# Patient Record
(demographics unavailable — no encounter records)

---

## 2025-01-14 NOTE — HISTORY OF PRESENT ILLNESS
[FreeTextEntry1] : Seen initially with  acute onset gross hematuria with clots 9/19. . He had no flank or abdominal pain but did note hesitancy, decreased FOS and some straining to void.  He had USG which noted left hydronephrosis and a 5cm mass in the bladder though unclear if this clot or tissue as no color doppler on the films reviewed. S/p TURBt of large tumor in the trigone around the later wall and BN to 12 o'clock. 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 CT PET scan negative for metastasis but has high grade obstruction left kidney. Cr 1.3 pre-TURBT S/p just completed chemo-radiation; Having frequency every 1.5 hours, nocturia 5 times  with some burning at the end of voiding. No hematuria. He is on tamsulosin.  CT scan noted mass enlarging but now S/P stent exchange and TURBT: path NO cancer. ended up admitted after claire out with febrile UTI.  Seen last month  with several days right sided abdominal pain - back to front. harder to move around. His frequency better last few day. Wife reports appetite down. CT scan was Ok though had gallstones. saw MV 2 weeks ago with UTI: pseudomonas again. Finished antibiotic last week. Voiding OK with no sign of UTI.  seen earlier this month with >12 hours of dysuria and increased frequency; no hematuria or fevers chills but he;s concerned about UTI. Culture last week negative (that was a routine one). He has had 2 Pseudomonas UTIs before. Just started suppression.  called on Thursday with dysuria and blader pain with increased frequency; no fevers, chills or hematuria. Started Ciprofloxacin Saturday  - symptoms better. Culture as before: Pseudomonas.   changed stent in July: still has necrotic tissues in area - biopsy proven. still on low dose Cipro - no UTI symptoms. urine clear and no hematuria.  Now on Methenamine - urine clear with no cloudiness, hematuria or dysuria. C/o on neck pain when turns head etc. Weight good.   12/21 - CT scans negative e June; changed stent in October - still narrowing   2/22 - changed stent in January - stills has narrow distal ureter with dilated ureter above so replaced JJ doing well with no UTI symptoms or hematuria.  9/22 - stent changed last visit - necrosis less @ the UO but still with distal narrowing no UTIs on methenamine   12/22 here after one episode gross hematuria 2 days ago. No clots dysuria or pain or change in voiding. no fevers or chllls though felt cold thus am . No flank pain   2/23 last stent exchange did ureteroscopy - very tight stricture: Biopsy negative cancer  CT scan this week DOMINIK    5/23 still dealing with glaucoma - one had procedure second pending. had discussed ;last visit doing reimplant  no bleeding - occasional dysuria   2/6/24 - F/u visit s/p change of renal stent in November.  Since then abx given x1 for c/o burning urination. Currently denies hematuria, dysuria.  CT urogram WAW done on Jan 11 - here for clarification of results  reviewed CT scan and others going back for 2 years: all stable   7/2/24 - Presents today for POA s/p cystoscopy, exchange of left double-J stent on 5/20/24 - done every 6 months.  CT urogram and chest CT s/p procedure stable, no acute changes noted.  Denies flank pain. dysuria or hematuria. Last UTI in May before procedure.  Maintained on methenamine x 2 years for h/i recurrent UTIS   1/25 - stent exchange 11/24 had heart sten tplaced 12/24  no issues - occasional; hematuria - small and or dysuria  negative imagine 12/24

## 2025-01-15 NOTE — REVIEW OF SYSTEMS
[Fatigue] : fatigue [Diarrhea: Grade 0] : Diarrhea: Grade 0 [Dysuria] : dysuria [Joint Pain] : joint pain [Fever] : no fever [Chills] : no chills [Chest Pain] : no chest pain [Palpitations] : no palpitations [Lower Ext Edema] : no lower extremity edema [Shortness Of Breath] : no shortness of breath [Cough] : no cough [Abdominal Pain] : no abdominal pain [Vomiting] : no vomiting [Constipation] : no constipation [Joint Stiffness] : no joint stiffness [Muscle Pain] : no muscle pain [Muscle Weakness] : no muscle weakness [Skin Rash] : no skin rash [Dizziness] : no dizziness [Difficulty Walking] : no difficulty walking [Easy Bleeding] : no tendency for easy bleeding [FreeTextEntry8] : as above

## 2025-01-15 NOTE — HISTORY OF PRESENT ILLNESS
[T: ___] : T[unfilled] [N: ___] : N[unfilled] [M: ___] : M[unfilled] [AJCC Stage: ____] : AJCC Stage: [unfilled] [de-identified] : Vance Balderas is a 71-year-old male with bladder cancer who was found to have microscopic hematuria in the beginning of this year and diagnosed with UTI, treated with antibiotics. He presented with acute onset gross hematuria with clots on August 29, associated with lower abdominal pressure pain with difficulty urination, after blood clots coming out from his urine he felt easier to pass the urine.  He came to [facility] ED, had USG which noted left hydronephrosis and a 5cm mass in the bladder though unclear if this clot or tissue as no color doppler on the films reviewed. He was d/c'd and followed up with Dr. Feldman. Again he presented above mentioned symptoms, visited [facility] ED x2 and was placed Claire catheter. He underwent cystoscopy and was found to have a 5cm bladder tumor left trigone, had TURBT on 9/9/2019. 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 His claire catheter was removed on September 11 at Dr. Feldman's clinic.  PET CT 9/26/2019 revealed hypermetabolic urinary bladder wall thickening extending into the left UPJ and left distal ureter, left ureteral obstruction, no evidence of metastasis. Noncon CT brain did not show brain metastasis.   Urine flow is normal, no gross hematuria and blood clots. He does not have hesitancy, straining for urination, burning and dysuria and incontinence.  Nocturia 2 to 3.    11/13/19 : MRI abdomen and pelvis on Nov 13 showed interval decrease from 3.8x2.5cm in September to recent 3.5x1.1cmn the bladder trigone area  11/18/19 : S/P C2 carboplatin and gemcitabine, he felt nauseous one day, has weakness, fatigue and dizziness, no diarrhea. His appetite was reduced. Now he feels much better for his appetite, no nausea/vomiting/constipation. He had non-pruritic skin rashes on both arms, both anterior thigh and right flank area on Nov 11 and 12, disappeared after taking benadryl 50mg daily x2  . 12/9/19 : s/p Cycle 3 Carboplatin + Etoposide 12/23/19 start Cycle 4, Rt to start tomorrow. Fatigue and mild nausea bothers him and has poor oral intake 3-4 days after chemotherapy. Taste buds with metal taste and thus difficult to eat however keeping his weight stable. No ha, chest pain, shortness of breath, NVD aside from 3-4 days after rx and decreased appetite.   1/2/20 : Cycle 4 Day 11 of Carboplatin + Etoposide, continues on RT until 2/2/2020. Feels fatigued overall but this is day 11 and should be on the mend. Has lingering metallic taste in the mouth. No fevers, chills, SOB, chest pain. cough, dysuria, or hematuria.    2/3/2020: Completed cycle 4 chemo on 1/2/2020, RT on 2/3/2020 with total 6 weeks of course. Has mild fatigue, burning sensation only in the beginning of urination, denies gross hematuria, dysuria.   Nocturia 5.  2/14/2020: CT abdomen and pelvis showed slightly increased size left posterolateral bladder wall mass since 11/13/2019. Persistent thickening distal left ureter right, new right lateral bladder wall thickening.  10/16/2020 he started neck pain while moving around his neck, does not have headache, blurry vision and unsteady gait. He denies gross hematuria and abdominal pain.   2/24/2020 he underwent cystoscopy TURBT, pathology showed no cancer.    3/23/2020  Stable pulmonary nodules.  No upper tract lesion identified. Thickening along the left posterior lateral bladder and associated outpouching at the UPJ is essentially unchanged in appearance.   5/4/2020: He has burning urination, frequency, fever, 102. UA showed UTI. He completed levofloxacin 750mg daily for 7 days, Methenamine for 3 months. Post levofloxacin, urine culture is negative. He states below knee pain Left vs right. He needs ureteral stent exchange due to virus pandemic which is delayed.   5/26/2020 CT c/a/p showed stable exam, no change in the thickening of the left posterior bladder wall.   6/15/2020 he has been on cipro 250mg daily for 20 days for prophylaxis given frequent UTI history. He denies fever, burning urination, frequency. He is waiting for ureteral stent removed vs exchanged.   7/2/2020 TURBT showed benign tissue in the bladder.   8/17/2020 CT c/a/p revealed mild bladder wall thickening, no disease in the upper tract.   9/16/2020 he developed a couple of pseudomonas UTIs in July and early August. He reports left leg muscle pain more than 5 months,  intermittent, sometimes mild, other time severe, not related to walking and resting. Both cheek skins have itchiness. Urination is normal, he denies burning, frequency, gross hematuria. He takes methenamine for UTI prophylaxis.   10/16/2020 : Mr. PONCE is here for a follow up. He as recently seen in [facility]........ should be seen in December unless there is an issue.   11/11/2020: CT torso : No edvidence of disease, RML 5 mm lung nodule without change.   11/16/2020: Mr. PONCE is here for a follow up post surveillance CT showing DOMINIK. He complains of headache and neck pain around two months, denies unsteady gait, nausea, vomiting, blurry vision, and dizziness.   12/6/2020 MRI head and cervical spine: Possible endplate erosions or Schmorl's nodes involving the endplates adjoining the C6-C7 disc space. Apparent edema involving the C6-C7 intervertebral disc and adjoining endplates. Evaluation for abnormal enhancement is limited due to motion on the sagittal T1 fat-saturated postcontrast images. Findings likely represent the presence of degenerative changes. The presence of discitis/osteomyelitis is not excluded.  12/7/2020 he states his headache was resolved, neck pain is improved, otherwise denies fever, cough and chest pain.    1/13/2021 His neck pain is sometimes more, 5-6/10, sometimes less 3-4/10. Cystoscopy on 1/11 with Dr. Feldman showed necrotic area around ureteral orifice, decreased in size from prior. He denies gross hematuria.   2/14/21 MRI cervical spine showed edema at C3 and C4 likely related to stress reaction. The degree of edema and enhancement at this level is decreased compared to the prior examination.  2/26 He still complains of b/l neck pain sometimes 6-9/10 intensity, relieved by ibuprofen and tylenol. He eats well, denies gross hematuria and abdominal pain.    3/14/2021 ; CTU + abd and chest IMPRESSION: RML lung nodule stable at 5 mm. Mild irregular bladder wall thickening along the left trigone most likely representing the patient's known bladder tumor. Mild soft tissue thickening along the proximal left ureter; uncertain if this is due to inflammation arising from the presence of the stent versus ureteral lesion. No evidence of metastatic disease.  3/24/2021 : Mr. Ponce joined me via telehealth. His restaging scan son 3/14/2021 showed no clearcut recurrence however some thickening was noted along the proximal ureter. He still has neck pain 8/10, will be seen by Dr. Lozada and Dr. Maldonado, Orthopedics for topical injection. He continues to be on methenamine for UTI prophylaxis. He denies gross hematuria, abdominal pain and weight loss.  6/22/2021  Stable mild thickening of the left posterior bladder wall involving the left ureterovesical junction. Unchanged thickening of the distal left ureteral wall. Improved thickening of the left mid ureteral wall. Stable lobulation in the contour of the anterior left prostate gland as noted above. The left seminal vesicle is also anteriorly deviated. See above. No developing metastatic disease nor adenopathy.   7/23/21 : Here for a follow up after scans. Some fatigue in the evening, but able to take of himself and perform ADLs without difficulty. Mr. PONCE denies fevers, chills, headaches, cough, SOB, chest pain, any swelling, nausea, vomiting, diarrhea, rash, or malaise. Good PO and weight is good.   1/9/22 CT c/a/p showed At the distal ureter there is thickening of the ureter and the urinary bladder wall at the ureteral insertion. The adjacent left seminal vesicle is adherent to the ureter and the prostate is slightly shifted to the left pelvic sidewall.  1/19/2022: here for follow up. He had urinary stent exchange done 1/10 by Dr. Feldman. He has been doing well.  7/20/22 left ureteral stent was changed recently. He reports feeling weak and cold in AM, denies gross hematuria.   1/18/23 left ureteral stent was changed on Jan 9. He feels cold and weak, denies gross hematuria.   7/19/23 reports dizziness, denies blurry vision and headache.  [de-identified] : small cell bladder cancer  [FreeTextEntry1] : Carbo + Etoposide + RT (2/2020) [de-identified] : 1/8/24: Patient reports that for the last two days when he urinates he feels burning and has white discharge for two days. He denies fever and back pain. Denies frequency, urgency and hematuria. Patient also reports this sensation mid last month, called Urology and was prescribed keflex. He was also prescribed abx for UTI on several other occasions this year. Says symptoms resolved with abx, but have recurred. His ureteral stent was last exchanged on 11/27/23. He has an appt wiht Urology on 2/6.   Reports waxing and waning anergy. Appetite is okay - for lunch he has a good portion, dinner is smaller. He sometimes takes omeprazole for acid reflux. Denies diarrhea, sometimes harder stool, however has BM 1-2x per day. He denies chest pain, palpitations, shortness of breath and coughing. He has chronic intermittent joint pain on his left side, takes tylenol. Denies headache, visual changes and neuropathy.    Urology:               2/6/24:                         - s/p change of renal stent in November.                         - s/p abx  x1 for c/o burning on urination. Currently denies hematuria, dysuria. f/b CT urogram WAW.                          7/2/24:                         - seen for POA s/p cystoscopy, exchange of left double-J stent on 5/20/24 - done every 6 months.                         - CT urogram and chest CT s/p procedure stable, no acute changes noted. Denies flank pain. dysuria or hematuria. Last UTI in May before procedure.                         - Maintained on methenamine Hippurate x 2 years for h/o recurrent UTIs  Medical Oncology 7/10/24 - pt is doing well, NAD, recently noted to have recurrent UTI that is been treated with cephalexin 500mg  T.i.D by Dr. Feldman urologist since 7/6/24 [will complete on 7/13/24]. Will resume prophylactic Methenamine Hippurate post completion of cephalexin. Urine flow is normal, no hesitancy, or pain but with leakage.     CT CAP on June 26, 2024:  - revealed Mild wall thickening of the left posterolateral bladder wall and left aspect of the trigone, without   significant change. - Left double pigtail ureteral stent. Mild circumferential wall thickening of the proximal to distal left ureter, without significant change. This may be related to inflammation. No filling    defects within bilateral pelvicalyceal systems or ureters to indicate urothelial lesion.  - No evidence of hydronephrosis intrarenal calculus ureteral calculus bilaterally.  - No evidence of bladder calculus. No change in right pulmonary nodules. These are unchanged from 6/22/2021, given the lack of interval change in 3 years likely benign in    nature.  1/15/25: Patient presents for follow up with wife. Has some hematuria. CT c/a/p (12/18/24): DOMNIIK. Stable lung nodules.  s/p heart stent in December 2, 2024. He is now on prasugrel 10 mg daily now for one year, and aspirin 81 mg daily, and atorvastatin. He denies increased urinary frequency. At night, gets up 2-3x to urinate.

## 2025-01-15 NOTE — HISTORY OF PRESENT ILLNESS
[T: ___] : T[unfilled] [N: ___] : N[unfilled] [M: ___] : M[unfilled] [AJCC Stage: ____] : AJCC Stage: [unfilled] [de-identified] : Vance Balderas is a 71-year-old male with bladder cancer who was found to have microscopic hematuria in the beginning of this year and diagnosed with UTI, treated with antibiotics. He presented with acute onset gross hematuria with clots on August 29, associated with lower abdominal pressure pain with difficulty urination, after blood clots coming out from his urine he felt easier to pass the urine.  He came to [facility] ED, had USG which noted left hydronephrosis and a 5cm mass in the bladder though unclear if this clot or tissue as no color doppler on the films reviewed. He was d/c'd and followed up with Dr. Feldman. Again he presented above mentioned symptoms, visited [facility] ED x2 and was placed Claire catheter. He underwent cystoscopy and was found to have a 5cm bladder tumor left trigone, had TURBT on 9/9/2019. 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 His claire catheter was removed on September 11 at Dr. Feldman's clinic.  PET CT 9/26/2019 revealed hypermetabolic urinary bladder wall thickening extending into the left UPJ and left distal ureter, left ureteral obstruction, no evidence of metastasis. Noncon CT brain did not show brain metastasis.   Urine flow is normal, no gross hematuria and blood clots. He does not have hesitancy, straining for urination, burning and dysuria and incontinence.  Nocturia 2 to 3.    11/13/19 : MRI abdomen and pelvis on Nov 13 showed interval decrease from 3.8x2.5cm in September to recent 3.5x1.1cmn the bladder trigone area  11/18/19 : S/P C2 carboplatin and gemcitabine, he felt nauseous one day, has weakness, fatigue and dizziness, no diarrhea. His appetite was reduced. Now he feels much better for his appetite, no nausea/vomiting/constipation. He had non-pruritic skin rashes on both arms, both anterior thigh and right flank area on Nov 11 and 12, disappeared after taking benadryl 50mg daily x2  . 12/9/19 : s/p Cycle 3 Carboplatin + Etoposide 12/23/19 start Cycle 4, Rt to start tomorrow. Fatigue and mild nausea bothers him and has poor oral intake 3-4 days after chemotherapy. Taste buds with metal taste and thus difficult to eat however keeping his weight stable. No ha, chest pain, shortness of breath, NVD aside from 3-4 days after rx and decreased appetite.   1/2/20 : Cycle 4 Day 11 of Carboplatin + Etoposide, continues on RT until 2/2/2020. Feels fatigued overall but this is day 11 and should be on the mend. Has lingering metallic taste in the mouth. No fevers, chills, SOB, chest pain. cough, dysuria, or hematuria.    2/3/2020: Completed cycle 4 chemo on 1/2/2020, RT on 2/3/2020 with total 6 weeks of course. Has mild fatigue, burning sensation only in the beginning of urination, denies gross hematuria, dysuria.   Nocturia 5.  2/14/2020: CT abdomen and pelvis showed slightly increased size left posterolateral bladder wall mass since 11/13/2019. Persistent thickening distal left ureter right, new right lateral bladder wall thickening.  10/16/2020 he started neck pain while moving around his neck, does not have headache, blurry vision and unsteady gait. He denies gross hematuria and abdominal pain.   2/24/2020 he underwent cystoscopy TURBT, pathology showed no cancer.    3/23/2020  Stable pulmonary nodules.  No upper tract lesion identified. Thickening along the left posterior lateral bladder and associated outpouching at the UPJ is essentially unchanged in appearance.   5/4/2020: He has burning urination, frequency, fever, 102. UA showed UTI. He completed levofloxacin 750mg daily for 7 days, Methenamine for 3 months. Post levofloxacin, urine culture is negative. He states below knee pain Left vs right. He needs ureteral stent exchange due to virus pandemic which is delayed.   5/26/2020 CT c/a/p showed stable exam, no change in the thickening of the left posterior bladder wall.   6/15/2020 he has been on cipro 250mg daily for 20 days for prophylaxis given frequent UTI history. He denies fever, burning urination, frequency. He is waiting for ureteral stent removed vs exchanged.   7/2/2020 TURBT showed benign tissue in the bladder.   8/17/2020 CT c/a/p revealed mild bladder wall thickening, no disease in the upper tract.   9/16/2020 he developed a couple of pseudomonas UTIs in July and early August. He reports left leg muscle pain more than 5 months,  intermittent, sometimes mild, other time severe, not related to walking and resting. Both cheek skins have itchiness. Urination is normal, he denies burning, frequency, gross hematuria. He takes methenamine for UTI prophylaxis.   10/16/2020 : Mr. PONCE is here for a follow up. He as recently seen in [facility]........ should be seen in December unless there is an issue.   11/11/2020: CT torso : No edvidence of disease, RML 5 mm lung nodule without change.   11/16/2020: Mr. PONCE is here for a follow up post surveillance CT showing DOMINIK. He complains of headache and neck pain around two months, denies unsteady gait, nausea, vomiting, blurry vision, and dizziness.   12/6/2020 MRI head and cervical spine: Possible endplate erosions or Schmorl's nodes involving the endplates adjoining the C6-C7 disc space. Apparent edema involving the C6-C7 intervertebral disc and adjoining endplates. Evaluation for abnormal enhancement is limited due to motion on the sagittal T1 fat-saturated postcontrast images. Findings likely represent the presence of degenerative changes. The presence of discitis/osteomyelitis is not excluded.  12/7/2020 he states his headache was resolved, neck pain is improved, otherwise denies fever, cough and chest pain.    1/13/2021 His neck pain is sometimes more, 5-6/10, sometimes less 3-4/10. Cystoscopy on 1/11 with Dr. Feldman showed necrotic area around ureteral orifice, decreased in size from prior. He denies gross hematuria.   2/14/21 MRI cervical spine showed edema at C3 and C4 likely related to stress reaction. The degree of edema and enhancement at this level is decreased compared to the prior examination.  2/26 He still complains of b/l neck pain sometimes 6-9/10 intensity, relieved by ibuprofen and tylenol. He eats well, denies gross hematuria and abdominal pain.    3/14/2021 ; CTU + abd and chest IMPRESSION: RML lung nodule stable at 5 mm. Mild irregular bladder wall thickening along the left trigone most likely representing the patient's known bladder tumor. Mild soft tissue thickening along the proximal left ureter; uncertain if this is due to inflammation arising from the presence of the stent versus ureteral lesion. No evidence of metastatic disease.  3/24/2021 : Mr. Ponce joined me via telehealth. His restaging scan son 3/14/2021 showed no clearcut recurrence however some thickening was noted along the proximal ureter. He still has neck pain 8/10, will be seen by Dr. Lozada and Dr. Maldonado, Orthopedics for topical injection. He continues to be on methenamine for UTI prophylaxis. He denies gross hematuria, abdominal pain and weight loss.  6/22/2021  Stable mild thickening of the left posterior bladder wall involving the left ureterovesical junction. Unchanged thickening of the distal left ureteral wall. Improved thickening of the left mid ureteral wall. Stable lobulation in the contour of the anterior left prostate gland as noted above. The left seminal vesicle is also anteriorly deviated. See above. No developing metastatic disease nor adenopathy.   7/23/21 : Here for a follow up after scans. Some fatigue in the evening, but able to take of himself and perform ADLs without difficulty. Mr. PONCE denies fevers, chills, headaches, cough, SOB, chest pain, any swelling, nausea, vomiting, diarrhea, rash, or malaise. Good PO and weight is good.   1/9/22 CT c/a/p showed At the distal ureter there is thickening of the ureter and the urinary bladder wall at the ureteral insertion. The adjacent left seminal vesicle is adherent to the ureter and the prostate is slightly shifted to the left pelvic sidewall.  1/19/2022: here for follow up. He had urinary stent exchange done 1/10 by Dr. Feldman. He has been doing well.  7/20/22 left ureteral stent was changed recently. He reports feeling weak and cold in AM, denies gross hematuria.   1/18/23 left ureteral stent was changed on Jan 9. He feels cold and weak, denies gross hematuria.   7/19/23 reports dizziness, denies blurry vision and headache.  [de-identified] : small cell bladder cancer  [FreeTextEntry1] : Carbo + Etoposide + RT (2/2020) [de-identified] : 1/8/24: Patient reports that for the last two days when he urinates he feels burning and has white discharge for two days. He denies fever and back pain. Denies frequency, urgency and hematuria. Patient also reports this sensation mid last month, called Urology and was prescribed keflex. He was also prescribed abx for UTI on several other occasions this year. Says symptoms resolved with abx, but have recurred. His ureteral stent was last exchanged on 11/27/23. He has an appt wiht Urology on 2/6.   Reports waxing and waning anergy. Appetite is okay - for lunch he has a good portion, dinner is smaller. He sometimes takes omeprazole for acid reflux. Denies diarrhea, sometimes harder stool, however has BM 1-2x per day. He denies chest pain, palpitations, shortness of breath and coughing. He has chronic intermittent joint pain on his left side, takes tylenol. Denies headache, visual changes and neuropathy.    Urology:               2/6/24:                         - s/p change of renal stent in November.                         - s/p abx  x1 for c/o burning on urination. Currently denies hematuria, dysuria. f/b CT urogram WAW.                          7/2/24:                         - seen for POA s/p cystoscopy, exchange of left double-J stent on 5/20/24 - done every 6 months.                         - CT urogram and chest CT s/p procedure stable, no acute changes noted. Denies flank pain. dysuria or hematuria. Last UTI in May before procedure.                         - Maintained on methenamine Hippurate x 2 years for h/o recurrent UTIs  Medical Oncology 7/10/24 - pt is doing well, NAD, recently noted to have recurrent UTI that is been treated with cephalexin 500mg  T.i.D by Dr. Feldman urologist since 7/6/24 [will complete on 7/13/24]. Will resume prophylactic Methenamine Hippurate post completion of cephalexin. Urine flow is normal, no hesitancy, or pain but with leakage.     CT CAP on June 26, 2024:  - revealed Mild wall thickening of the left posterolateral bladder wall and left aspect of the trigone, without   significant change. - Left double pigtail ureteral stent. Mild circumferential wall thickening of the proximal to distal left ureter, without significant change. This may be related to inflammation. No filling    defects within bilateral pelvicalyceal systems or ureters to indicate urothelial lesion.  - No evidence of hydronephrosis intrarenal calculus ureteral calculus bilaterally.  - No evidence of bladder calculus. No change in right pulmonary nodules. These are unchanged from 6/22/2021, given the lack of interval change in 3 years likely benign in    nature.  1/15/25: Patient presents for follow up with wife. Has some hematuria. CT c/a/p (12/18/24): DOMINIK. Stable lung nodules.  s/p heart stent in December 2, 2024. He is now on prasugrel 10 mg daily now for one year, and aspirin 81 mg daily, and atorvastatin. He denies increased urinary frequency. At night, gets up 2-3x to urinate.

## 2025-01-15 NOTE — ASSESSMENT
[FreeTextEntry1] : 71-year-old male with history of localized small cell bladder cancer, treated with 4 cycles of carboplatin/etoposide and 6-week RT completed on 2/3/2020. Since monitored on [id number] surveillance scans.   Plan h/o small cell bladder cancer  - continue to monitor [id number] scans,  - Will get stent exchange with urologist - Labs pending - 12/18/24 CT Urogram and CT chest showed no evidence of recurrence. Next in June 2025, ordered.   Recurrent UTI  - pt reports dysuria and white discharge x 2 days, has been on and off abx for last several months for UTI, will do UA and UCx today - Continues on Methenamine Hippurate for UTI prophylactic.    Instructed to contact our office with any new/worsening symptoms. Patient educated regarding plan of care, all questions/concerns addressed to the best of my abilities and patient's apparent satisfaction.  RTC 6 months  Patient seen and d/w Dr. Meaghan García MD PGY5 Hematology/Oncology Pager: [phone] I have participated in history collection, physical exam and making assessement and plan through his clinic visit. I also reviewed and edited the note.

## 2025-01-15 NOTE — PHYSICAL EXAM
[Fully active, able to carry on all pre-disease performance without restriction] : Status 0 - Fully active, able to carry on all pre-disease performance without restriction [Normal] : affect appropriate [de-identified] : anicteric  [de-identified] : supple, non tender  [de-identified] : no edema

## 2025-01-15 NOTE — ASSESSMENT
[FreeTextEntry1] : 71-year-old male with history of localized small cell bladder cancer, treated with 4 cycles of carboplatin/etoposide and 6-week RT completed on 2/3/2020. Since monitored on [id number] surveillance scans.   Plan h/o small cell bladder cancer  - continue to monitor [id number] scans,  - Will get stent exchange with urologist - Labs pending - 12/18/24 CT Urogram and CT chest showed no evidence of recurrence. Next in June 2025, ordered.   Recurrent UTI  - pt reports dysuria and white discharge x 2 days, has been on and off abx for last several months for UTI, will do UA and UCx today - Continues on Methenamine Hippurate for UTI prophylactic.    Instructed to contact our office with any new/worsening symptoms. Patient educated regarding plan of care, all questions/concerns addressed to the best of my abilities and patient's apparent satisfaction.  RTC 6 months  Patient seen and d/w Dr. Meaghan García MD PGY5 Hematology/Oncology Pager: [phone] I have participated in history collection, physical exam and making assessement and plan through his clinic visit. I also reviewed and edited the note.

## 2025-01-15 NOTE — PHYSICAL EXAM
[Fully active, able to carry on all pre-disease performance without restriction] : Status 0 - Fully active, able to carry on all pre-disease performance without restriction [Normal] : affect appropriate [de-identified] : anicteric  [de-identified] : supple, non tender  [de-identified] : no edema

## 2025-01-30 NOTE — HISTORY OF PRESENT ILLNESS
[de-identified] : Patient referred by Dr. Harding for evaluation of primary hyperparathyroidism.  Patient was diagnosed with bladder cancer 2019.  Recently noted to have elevated calcium.  Reports hypertension, reflux, fatigue and history of radiation treatments.  Denies kidney stones, dysphagia or change in voice.  Blood work January 2025: Calcium 10, PTH 84, vitamin D 49.  Patient underwent coronary artery stent placement 2024 no on blood thinners for 6 months to 1 year.  I have reviewed all old and new data and available images.  Additional information was obtained from others present at the time of the visit to ensure the completeness of the history.

## 2025-01-30 NOTE — PHYSICAL EXAM
[de-identified] : no cervical or supraclavicular adenopathy, trachea midline, thyroid without enlargement or palpable mass [Normal] : no neck adenopathy [de-identified] : Skin:  normal appearance.  no rash, nodules, vesicles, or erythema, Musculoskeletal:  full range of motion and no deformities appreciated Neurological:  grossly intact Psychiatric:  oriented to person, place and time with appropriate affect

## 2025-01-30 NOTE — CONSULT LETTER
[Dear  ___] : Dear  [unfilled], [Consult Letter:] : I had the pleasure of evaluating your patient, [unfilled]. [Please see my note below.] : Please see my note below. [Consult Closing:] : Thank you very much for allowing me to participate in the care of this patient.  If you have any questions, please do not hesitate to contact me. [Sincerely,] : Sincerely, [FreeTextEntry3] : Peggy Paniagua MD, FACS Assistant Professor of Surgery and Otolaryngology [facility]

## 2025-01-30 NOTE — ASSESSMENT
[FreeTextEntry1] : Patient with recently noted primary hyperparathyroidism.  I have requested a 24-hour urine calcium and a bone density for further evaluation.  If abnormal well findings are identified, surgery will be recommended once cleared to stop blood thinners.  Patient would require medical clearance preoperatively as well as a 4-dimensional CT scan for preoperative localization.  I would recommend neurophysiologic monitoring intraoperatively due to cervical disc disease.  I have reviewed the pathophysiology of the disease process, the area anatomy and the rationale for surgery.  I discussed the risks, benefits and alternative treatments which include but are not limited to bleeding, infection, numbness, hoarseness, hypocalcemia, scarring, and need for reoperation.  I have answered the patient's questions to their satisfaction.  The patient will contact my office to review results.  If no surgery performed, RTO 4 months.

## 2025-05-13 NOTE — ASSESSMENT
[FreeTextEntry1] : Patient with recently noted primary hyperparathyroidism. 24-hour urine calcium was low and a bone density normal.  Blood work sent to assess calcium level.  Unless extreme hypercalcemia, just observation.  If surgery necessary, I would recommend neurophysiologic monitoring intraoperatively due to cervical disc disease.  Patient would need 4-dimensional CT scan for preoperative localization.  I have reviewed the pathophysiology of the disease process, the area anatomy and the rationale for surgery.  I discussed the risks, benefits and alternative treatments which include but are not limited to bleeding, infection, numbness, hoarseness, hypocalcemia, scarring, and need for reoperation.  I have answered the patient's questions to their satisfaction.  The patient will contact my office to review results.  If no surgery performed, RTO 4 months.

## 2025-05-13 NOTE — PHYSICAL EXAM
[de-identified] : no cervical or supraclavicular adenopathy, trachea midline, thyroid without enlargement or palpable mass [Normal] : no neck adenopathy [de-identified] : Skin:  normal appearance.  no rash, nodules, vesicles, or erythema, Musculoskeletal:  full range of motion and no deformities appreciated Neurological:  grossly intact Psychiatric:  oriented to person, place and time with appropriate affect

## 2025-05-13 NOTE — HISTORY OF PRESENT ILLNESS
[de-identified] : Patient referred by Dr. Harding for evaluation of primary hyperparathyroidism.  Patient was diagnosed with bladder cancer 2019.  Recently noted to have elevated calcium.  Reports hypertension, reflux, fatigue and history of radiation treatments.  Denies kidney stones, dysphagia or change in voice.  Blood work January 2025: Calcium 10, PTH 84, vitamin D 49.  Patient underwent coronary artery stent placement 2024 no on blood thinners for 6 months to 1 year.  Bone density February 2025: Normal.  24-hour urine calcium February 2025: Less than 18.  Patient scheduled next week for removal of ureter stent after treatment for bladder cancer.  I have reviewed all old and new data and available images.  Additional information was obtained from others present at the time of the visit to ensure the completeness of the history.

## 2025-07-07 NOTE — PHYSICAL EXAM
[Fully active, able to carry on all pre-disease performance without restriction] : Status 0 - Fully active, able to carry on all pre-disease performance without restriction [Normal] : affect appropriate [de-identified] : anicteric  [de-identified] : supple, non tender  [de-identified] : no edema

## 2025-07-07 NOTE — HISTORY OF PRESENT ILLNESS
[T: ___] : T[unfilled] [N: ___] : N[unfilled] [M: ___] : M[unfilled] [AJCC Stage: ____] : AJCC Stage: [unfilled] [de-identified] : Vance Balderas is a 71-year-old male with bladder cancer who was found to have microscopic hematuria in the beginning of this year and diagnosed with UTI, treated with antibiotics. He presented with acute onset gross hematuria with clots on August 29, associated with lower abdominal pressure pain with difficulty urination, after blood clots coming out from his urine he felt easier to pass the urine.  He came to [facility] ED, had USG which noted left hydronephrosis and a 5cm mass in the bladder though unclear if this clot or tissue as no color doppler on the films reviewed. He was d/c'd and followed up with Dr. Feldman. Again he presented above mentioned symptoms, visited [facility] ED x2 and was placed Claire catheter. He underwent cystoscopy and was found to have a 5cm bladder tumor left trigone, had TURBT on 9/9/2019. 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 His claire catheter was removed on September 11 at Dr. Feldman's clinic.  PET CT 9/26/2019 revealed hypermetabolic urinary bladder wall thickening extending into the left UPJ and left distal ureter, left ureteral obstruction, no evidence of metastasis. Noncon CT brain did not show brain metastasis.   Urine flow is normal, no gross hematuria and blood clots. He does not have hesitancy, straining for urination, burning and dysuria and incontinence.  Nocturia 2 to 3.    11/13/19 : MRI abdomen and pelvis on Nov 13 showed interval decrease from 3.8x2.5cm in September to recent 3.5x1.1cmn the bladder trigone area  11/18/19 : S/P C2 carboplatin and gemcitabine, he felt nauseous one day, has weakness, fatigue and dizziness, no diarrhea. His appetite was reduced. Now he feels much better for his appetite, no nausea/vomiting/constipation. He had non-pruritic skin rashes on both arms, both anterior thigh and right flank area on Nov 11 and 12, disappeared after taking benadryl 50mg daily x2  . 12/9/19 : s/p Cycle 3 Carboplatin + Etoposide 12/23/19 start Cycle 4, Rt to start tomorrow. Fatigue and mild nausea bothers him and has poor oral intake 3-4 days after chemotherapy. Taste buds with metal taste and thus difficult to eat however keeping his weight stable. No ha, chest pain, shortness of breath, NVD aside from 3-4 days after rx and decreased appetite.   1/2/20 : Cycle 4 Day 11 of Carboplatin + Etoposide, continues on RT until 2/2/2020. Feels fatigued overall but this is day 11 and should be on the mend. Has lingering metallic taste in the mouth. No fevers, chills, SOB, chest pain. cough, dysuria, or hematuria.    2/3/2020: Completed cycle 4 chemo on 1/2/2020, RT on 2/3/2020 with total 6 weeks of course. Has mild fatigue, burning sensation only in the beginning of urination, denies gross hematuria, dysuria.   Nocturia 5.  2/14/2020: CT abdomen and pelvis showed slightly increased size left posterolateral bladder wall mass since 11/13/2019. Persistent thickening distal left ureter right, new right lateral bladder wall thickening.  10/16/2020 he started neck pain while moving around his neck, does not have headache, blurry vision and unsteady gait. He denies gross hematuria and abdominal pain.   2/24/2020 he underwent cystoscopy TURBT, pathology showed no cancer.    3/23/2020  Stable pulmonary nodules.  No upper tract lesion identified. Thickening along the left posterior lateral bladder and associated outpouching at the UPJ is essentially unchanged in appearance.   5/4/2020: He has burning urination, frequency, fever, 102. UA showed UTI. He completed levofloxacin 750mg daily for 7 days, Methenamine for 3 months. Post levofloxacin, urine culture is negative. He states below knee pain Left vs right. He needs ureteral stent exchange due to virus pandemic which is delayed.   5/26/2020 CT c/a/p showed stable exam, no change in the thickening of the left posterior bladder wall.   6/15/2020 he has been on cipro 250mg daily for 20 days for prophylaxis given frequent UTI history. He denies fever, burning urination, frequency. He is waiting for ureteral stent removed vs exchanged.   7/2/2020 TURBT showed benign tissue in the bladder.   8/17/2020 CT c/a/p revealed mild bladder wall thickening, no disease in the upper tract.   9/16/2020 he developed a couple of pseudomonas UTIs in July and early August. He reports left leg muscle pain more than 5 months,  intermittent, sometimes mild, other time severe, not related to walking and resting. Both cheek skins have itchiness. Urination is normal, he denies burning, frequency, gross hematuria. He takes methenamine for UTI prophylaxis.   10/16/2020 : Mr. PONCE is here for a follow up. He as recently seen in [facility]........ should be seen in December unless there is an issue.   11/11/2020: CT torso : No edvidence of disease, RML 5 mm lung nodule without change.   11/16/2020: Mr. PONCE is here for a follow up post surveillance CT showing DOMINIK. He complains of headache and neck pain around two months, denies unsteady gait, nausea, vomiting, blurry vision, and dizziness.   12/6/2020 MRI head and cervical spine: Possible endplate erosions or Schmorl's nodes involving the endplates adjoining the C6-C7 disc space. Apparent edema involving the C6-C7 intervertebral disc and adjoining endplates. Evaluation for abnormal enhancement is limited due to motion on the sagittal T1 fat-saturated postcontrast images. Findings likely represent the presence of degenerative changes. The presence of discitis/osteomyelitis is not excluded.  12/7/2020 he states his headache was resolved, neck pain is improved, otherwise denies fever, cough and chest pain.    1/13/2021 His neck pain is sometimes more, 5-6/10, sometimes less 3-4/10. Cystoscopy on 1/11 with Dr. Feldman showed necrotic area around ureteral orifice, decreased in size from prior. He denies gross hematuria.   2/14/21 MRI cervical spine showed edema at C3 and C4 likely related to stress reaction. The degree of edema and enhancement at this level is decreased compared to the prior examination.  2/26 He still complains of b/l neck pain sometimes 6-9/10 intensity, relieved by ibuprofen and tylenol. He eats well, denies gross hematuria and abdominal pain.    3/14/2021 ; CTU + abd and chest IMPRESSION: RML lung nodule stable at 5 mm. Mild irregular bladder wall thickening along the left trigone most likely representing the patient's known bladder tumor. Mild soft tissue thickening along the proximal left ureter; uncertain if this is due to inflammation arising from the presence of the stent versus ureteral lesion. No evidence of metastatic disease.  3/24/2021 : Mr. Ponce joined me via telehealth. His restaging scan son 3/14/2021 showed no clearcut recurrence however some thickening was noted along the proximal ureter. He still has neck pain 8/10, will be seen by Dr. Lozada and Dr. Maldonado, Orthopedics for topical injection. He continues to be on methenamine for UTI prophylaxis. He denies gross hematuria, abdominal pain and weight loss.  6/22/2021  Stable mild thickening of the left posterior bladder wall involving the left ureterovesical junction. Unchanged thickening of the distal left ureteral wall. Improved thickening of the left mid ureteral wall. Stable lobulation in the contour of the anterior left prostate gland as noted above. The left seminal vesicle is also anteriorly deviated. See above. No developing metastatic disease nor adenopathy.   7/23/21 : Here for a follow up after scans. Some fatigue in the evening, but able to take of himself and perform ADLs without difficulty. Mr. PONCE denies fevers, chills, headaches, cough, SOB, chest pain, any swelling, nausea, vomiting, diarrhea, rash, or malaise. Good PO and weight is good.   1/9/22 CT c/a/p showed At the distal ureter there is thickening of the ureter and the urinary bladder wall at the ureteral insertion. The adjacent left seminal vesicle is adherent to the ureter and the prostate is slightly shifted to the left pelvic sidewall.  1/19/2022: here for follow up. He had urinary stent exchange done 1/10 by Dr. Feldman. He has been doing well.  7/20/22 left ureteral stent was changed recently. He reports feeling weak and cold in AM, denies gross hematuria.   1/18/23 left ureteral stent was changed on Jan 9. He feels cold and weak, denies gross hematuria.   7/19/23 reports dizziness, denies blurry vision and headache.  [de-identified] : small cell bladder cancer  [FreeTextEntry1] : Carbo + Etoposide + RT (2/2020) [de-identified] : 1/8/24: Patient reports that for the last two days when he urinates he feels burning and has white discharge for two days. He denies fever and back pain. Denies frequency, urgency and hematuria. Patient also reports this sensation mid last month, called Urology and was prescribed keflex. He was also prescribed abx for UTI on several other occasions this year. Says symptoms resolved with abx, but have recurred. His ureteral stent was last exchanged on 11/27/23. He has an appt wiht Urology on 2/6.   Reports waxing and waning anergy. Appetite is okay - for lunch he has a good portion, dinner is smaller. He sometimes takes omeprazole for acid reflux. Denies diarrhea, sometimes harder stool, however has BM 1-2x per day. He denies chest pain, palpitations, shortness of breath and coughing. He has chronic intermittent joint pain on his left side, takes tylenol. Denies headache, visual changes and neuropathy.    Urology:               2/6/24:                         - s/p change of renal stent in November.                         - s/p abx  x1 for c/o burning on urination. Currently denies hematuria, dysuria. f/b CT urogram WAW.                          7/2/24:                         - seen for POA s/p cystoscopy, exchange of left double-J stent on 5/20/24 - done every 6 months.                         - CT urogram and chest CT s/p procedure stable, no acute changes noted. Denies flank pain. dysuria or hematuria. Last UTI in May before procedure.                         - Maintained on methenamine Hippurate x 2 years for h/o recurrent UTIs  Medical Oncology 7/10/24 - pt is doing well, NAD, recently noted to have recurrent UTI that is been treated with cephalexin 500mg  T.i.D by Dr. Feldman urologist since 7/6/24 [will complete on 7/13/24]. Will resume prophylactic Methenamine Hippurate post completion of cephalexin. Urine flow is normal, no hesitancy, or pain but with leakage.     CT CAP on June 26, 2024:  - revealed Mild wall thickening of the left posterolateral bladder wall and left aspect of the trigone, without   significant change. - Left double pigtail ureteral stent. Mild circumferential wall thickening of the proximal to distal left ureter, without significant change. This may be related to inflammation. No filling    defects within bilateral pelvicalyceal systems or ureters to indicate urothelial lesion.  - No evidence of hydronephrosis intrarenal calculus ureteral calculus bilaterally.  - No evidence of bladder calculus. No change in right pulmonary nodules. These are unchanged from 6/22/2021, given the lack of interval change in 3 years likely benign in    nature.  1/15/25: Patient presents for follow up with wife. Has some hematuria. CT c/a/p (12/18/24): DOMINIK. Stable lung nodules.  s/p heart stent in December 2, 2024. He is now on prasugrel 10 mg daily now for one year, and aspirin 81 mg daily, and atorvastatin. He denies increased urinary frequency. At night, gets up 2-3x to urinate.   7/7/25 feels overall fine, denies groess hamturia, abdominal pain and weight loss. S/ left ureteral stent on May 19.

## 2025-07-07 NOTE — ASSESSMENT
[FreeTextEntry1] : 71-year-old male with history of localized small cell bladder cancer, treated with 4 cycles of carboplatin/etoposide and 6-week RT completed on 2/3/2020. Since monitored on [id number] surveillance scans.   Plan h/o small cell bladder cancer  - continue to monitor [id number] scans,  - Will get stent exchange with urologist - Labs pending - 12/18/24 CT Urogram and CT chest showed no evidence of recurrence.  -6/25/ 2025 CT chest/Urogram showed stable lung nodules and no evidence of recurrence and metastasis  Recurrent UTI  - pt reports dysuria and white discharge x 2 days, has been on and off abx for last several months for UTI, will do UA and UCx today - Continues on Methenamine Hippurate for UTI prophylactic.    hypercalcemia likely hyperparathyroidism f/u with Dr. Ryan   Instructed to contact our office with any new/worsening symptoms. Patient educated regarding plan of care, all questions/concerns addressed to the best of my abilities and patient's apparent satisfaction.  RTC 6 months

## 2025-07-07 NOTE — PHYSICAL EXAM
[Fully active, able to carry on all pre-disease performance without restriction] : Status 0 - Fully active, able to carry on all pre-disease performance without restriction [Normal] : affect appropriate [de-identified] : anicteric  [de-identified] : supple, non tender  [de-identified] : no edema

## 2025-07-07 NOTE — ASSESSMENT
[FreeTextEntry1] : 71-year-old male with history of localized small cell bladder cancer, treated with 4 cycles of carboplatin/etoposide and 6-week RT completed on 2/3/2020. Since monitored on [id number] surveillance scans.   Plan h/o small cell bladder cancer  - continue to monitor [id number] scans,  - Will get stent exchange with urologist - Labs pending - 12/18/24 CT Urogram and CT chest showed no evidence of recurrence.  -6/25/ 2025 CT chest/Urogram showed stable lung nodules and no evidence of recurrence and metastasis  Recurrent UTI  - pt reports dysuria and white discharge x 2 days, has been on and off abx for last several months for UTI, will do UA and UCx today - Continues on Methenamine Hippurate for UTI prophylactic.    hypercalcemia likely hyperparathyroidism f/u with Dr. Ryan   Instructed to contact our office with any new/worsening symptoms. Patient educated regarding plan of care, all questions/concerns addressed to the best of my abilities and patient's apparent satisfaction.  RTC 6 months